# Patient Record
Sex: FEMALE | Race: WHITE | NOT HISPANIC OR LATINO | Employment: STUDENT | URBAN - METROPOLITAN AREA
[De-identification: names, ages, dates, MRNs, and addresses within clinical notes are randomized per-mention and may not be internally consistent; named-entity substitution may affect disease eponyms.]

---
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
## 2022-07-26 ENCOUNTER — OFFICE VISIT (OUTPATIENT)
Dept: OBGYN CLINIC | Facility: CLINIC | Age: 22
End: 2022-07-26
Payer: COMMERCIAL

## 2022-07-26 VITALS
SYSTOLIC BLOOD PRESSURE: 122 MMHG | DIASTOLIC BLOOD PRESSURE: 84 MMHG | HEIGHT: 59 IN | BODY MASS INDEX: 28.1 KG/M2 | WEIGHT: 139.4 LBS

## 2022-07-26 DIAGNOSIS — N92.0 MENORRHAGIA WITH REGULAR CYCLE: Primary | ICD-10-CM

## 2022-07-26 DIAGNOSIS — Z30.011 OCP (ORAL CONTRACEPTIVE PILLS) INITIATION: ICD-10-CM

## 2022-07-26 PROCEDURE — 99202 OFFICE O/P NEW SF 15 MIN: CPT | Performed by: NURSE PRACTITIONER

## 2022-07-26 RX ORDER — NORETHINDRONE ACETATE AND ETHINYL ESTRADIOL 1MG-20(21)
1 KIT ORAL DAILY
Qty: 84 TABLET | Refills: 0 | Status: SHIPPED | OUTPATIENT
Start: 2022-07-26 | End: 2022-10-18

## 2022-07-26 NOTE — PROGRESS NOTES
Jami Maharaj is a 25 y o  female who presents for contraception counseling for protection as well as regulation of her periods  She is interested in initiation oral contraception  Her cycles are 28 days apart  Patient reports to have heavy menses during cycles and states to "go through a super tampon in about an hour "  The patient is sexually active  Pertinent past medical history: none  Menstrual History:    OB History        0    Para   0    Term   0       0    AB   0    Living   0       SAB   0    IAB   0    Ectopic   0    Multiple   0    Live Births   0                  Patient's last menstrual period was 2022 (exact date)  The following portions of the patient's history were reviewed and updated as appropriate: allergies, current medications, past family history, past medical history, past social history, past surgical history and problem list     Review of Systems  Pertinent items are noted in HPI  Objective      /84   Ht 4' 11" (1 499 m)   Wt 63 2 kg (139 lb 6 4 oz)   LMP 2022 (Exact Date)   BMI 28 16 kg/m²       Assessment and Plan     25 y o , starting OCP (estrogen/progesterone), no contraindications  I have reviewed the risk and benefits of Oral OCP's, including the risk of blood clots, elevated BP, weight gain and Headaches  Benefits include reducing painful menses and heavy bleeding  Administration reviewed as well  She is to start her pill pack with the start of her next menses  Discussed importance of taking pill daily at the same time  Discussed what to do if she misses a dose  Recommended consistent use of condoms as well  Follow up in 2 months

## 2022-10-31 ENCOUNTER — OFFICE VISIT (OUTPATIENT)
Dept: OBGYN CLINIC | Facility: CLINIC | Age: 22
End: 2022-10-31

## 2022-10-31 VITALS
BODY MASS INDEX: 28.22 KG/M2 | SYSTOLIC BLOOD PRESSURE: 116 MMHG | DIASTOLIC BLOOD PRESSURE: 78 MMHG | HEIGHT: 59 IN | WEIGHT: 140 LBS

## 2022-10-31 DIAGNOSIS — N92.0 MENORRHAGIA WITH REGULAR CYCLE: ICD-10-CM

## 2022-10-31 DIAGNOSIS — Z30.011 OCP (ORAL CONTRACEPTIVE PILLS) INITIATION: ICD-10-CM

## 2022-10-31 RX ORDER — NORETHINDRONE ACETATE AND ETHINYL ESTRADIOL 1MG-20(21)
1 KIT ORAL DAILY
Qty: 84 TABLET | Refills: 3 | Status: SHIPPED | OUTPATIENT
Start: 2022-10-31 | End: 2023-01-23

## 2022-10-31 NOTE — PROGRESS NOTES
Kapil Pina is a 23 y o  female who presents for contraception follow-up  She was started on OCP to regulate her heavy menses  The patient has no complaints today  Menses is light and having no cramps  The patient is sexually active  Pertinent past medical history: none  Menstrual History:  OB History        0    Para   0    Term   0       0    AB   0    Living   0       SAB   0    IAB   0    Ectopic   0    Multiple   0    Live Births   0                  Patient's last menstrual period was 10/17/2022  The following portions of the patient's history were reviewed and updated as appropriate: allergies, current medications, past family history, past medical history, past social history, past surgical history and problem list     Review of Systems  Pertinent items are noted in HPI  Objective      /78   Ht 4' 11" (1 499 m)   Wt 63 5 kg (140 lb)   LMP 10/17/2022   BMI 28 28 kg/m²      Physical Exam  Constitutional:       Appearance: She is well-developed  HENT:      Head: Normocephalic and atraumatic  Cardiovascular:      Rate and Rhythm: Normal rate and regular rhythm  Pulmonary:      Effort: Pulmonary effort is normal       Breath sounds: Normal breath sounds  Musculoskeletal:      Cervical back: Neck supple  Neurological:      Mental Status: She is alert and oriented to person, place, and time  Skin:     General: Skin is warm  Vitals and nursing note reviewed  Assessment and Plan     23 y o , continuing OCP (estrogen/progesterone), no contraindications  A refill of her OCP was sent to her pharmacy for one year  Follow up in 1 year for annual exam with pap smear or sooner if needed

## 2023-09-07 DIAGNOSIS — Z30.011 OCP (ORAL CONTRACEPTIVE PILLS) INITIATION: ICD-10-CM

## 2023-09-07 DIAGNOSIS — N92.0 MENORRHAGIA WITH REGULAR CYCLE: ICD-10-CM

## 2023-09-07 RX ORDER — NORETHINDRONE ACETATE AND ETHINYL ESTRADIOL AND FERROUS FUMARATE 1MG-20(21)
1 KIT ORAL DAILY
Qty: 84 TABLET | Refills: 0 | Status: SHIPPED | OUTPATIENT
Start: 2023-09-07

## 2023-11-30 DIAGNOSIS — Z30.011 OCP (ORAL CONTRACEPTIVE PILLS) INITIATION: ICD-10-CM

## 2023-11-30 DIAGNOSIS — N92.0 MENORRHAGIA WITH REGULAR CYCLE: ICD-10-CM

## 2023-11-30 RX ORDER — NORETHINDRONE ACETATE AND ETHINYL ESTRADIOL AND FERROUS FUMARATE 1MG-20(21)
1 KIT ORAL DAILY
Qty: 84 TABLET | Refills: 0 | Status: SHIPPED | OUTPATIENT
Start: 2023-11-30

## 2024-01-19 ENCOUNTER — ANNUAL EXAM (OUTPATIENT)
Dept: OBGYN CLINIC | Facility: CLINIC | Age: 24
End: 2024-01-19
Payer: COMMERCIAL

## 2024-01-19 VITALS
BODY MASS INDEX: 29.92 KG/M2 | WEIGHT: 148.4 LBS | DIASTOLIC BLOOD PRESSURE: 82 MMHG | SYSTOLIC BLOOD PRESSURE: 120 MMHG | HEIGHT: 59 IN

## 2024-01-19 DIAGNOSIS — N92.0 MENORRHAGIA WITH REGULAR CYCLE: ICD-10-CM

## 2024-01-19 DIAGNOSIS — Z01.419 ENCOUNTER FOR GYNECOLOGICAL EXAMINATION WITHOUT ABNORMAL FINDING: Primary | ICD-10-CM

## 2024-01-19 PROCEDURE — S0612 ANNUAL GYNECOLOGICAL EXAMINA: HCPCS | Performed by: PHYSICIAN ASSISTANT

## 2024-01-19 PROCEDURE — G0145 SCR C/V CYTO,THINLAYER,RESCR: HCPCS | Performed by: PHYSICIAN ASSISTANT

## 2024-01-19 RX ORDER — NORETHINDRONE ACETATE AND ETHINYL ESTRADIOL 1MG-20(21)
1 KIT ORAL DAILY
Qty: 84 TABLET | Refills: 3 | Status: SHIPPED | OUTPATIENT
Start: 2024-01-19

## 2024-01-19 RX ORDER — IBUPROFEN 600 MG/1
600 TABLET ORAL EVERY 6 HOURS PRN
COMMUNITY
Start: 2023-02-04 | End: 2024-02-04

## 2024-01-19 NOTE — PROGRESS NOTES
Assessment/Plan:    No problem-specific Assessment & Plan notes found for this encounter.       Diagnoses and all orders for this visit:    Encounter for gynecological examination without abnormal finding  -     Liquid-based pap, screening    Menorrhagia with regular cycle  -     norethindrone-ethinyl estradiol (Junel FE 1/20) 1-20 MG-MCG per tablet; Take 1 tablet by mouth daily    Other orders  -     ibuprofen (MOTRIN) 600 mg tablet; Take 600 mg by mouth every 6 (six) hours as needed        Pap done.  Refills of OCP sent to pharmacy.  Discussed hygiene and lifestyle changes to help prevent yeast infections.  Could be hormonally related.  Patient to call if they continue.  If no problems, patient to return in 1 year for routine gyn care.    Subjective:      Patient ID: Katherin Diamond is a 23 y.o. female.    Patient is here for annual exam.  States she is doing well.  No complaints on her OCP.  Periods are regular every 28 days, and bleeding lasts for 4 days.  She denies any heavy bleeding, severe cramping, headache, and mood symptoms.  Requests refills today.  She is sexually active with a monogamous partner; they are not using condoms.  She complains of recurrent yeast infections since October.  First one started after abx use.  Had cultures performed at school and was negative for BV, trichomonas, GC/chlamydia, ureaplasma, and mycoplasma.  Positive for C albicans.  Has been getting them about a week prior to her periods.  No other medication or lifestyle changes.  Has been treated with Diflucan.  She is asymptomatic today.  Patient denies any change in bowel/bladder habits, pelvic pain, bloating, abdominal pain, n/v, change in appetite, and thyroid disease.    Patient is performing self-breast exam.  Denies new masses, skin changes, nipple discharge, and pain/tenderness.        The following portions of the patient's history were reviewed and updated as appropriate: allergies, current medications, past family  "history, past medical history, past social history, past surgical history, and problem list.    Review of Systems   Constitutional:  Negative for appetite change and unexpected weight change.   Cardiovascular:         No masses, skin changes, nipple discharge, and pain/tenderness.   Gastrointestinal:  Negative for abdominal distention, abdominal pain, constipation, diarrhea, nausea and vomiting.   Genitourinary:  Negative for difficulty urinating, dysuria, frequency, genital sores, hematuria, menstrual problem, pelvic pain, urgency, vaginal bleeding, vaginal discharge and vaginal pain.         Objective:      /82 (BP Location: Left arm, Patient Position: Sitting, Cuff Size: Adult)   Ht 4' 11\" (1.499 m)   Wt 67.3 kg (148 lb 6.4 oz)   LMP 01/15/2024 (Exact Date)   BMI 29.97 kg/m²          Physical Exam  Vitals reviewed. Exam conducted with a chaperone present.   Constitutional:       Appearance: Normal appearance. She is well-developed.   Neck:      Thyroid: No thyromegaly.   Pulmonary:      Effort: Pulmonary effort is normal.   Chest:   Breasts:     Breasts are symmetrical.      Right: Normal. No swelling, bleeding, inverted nipple, mass, nipple discharge, skin change or tenderness.      Left: Normal. No swelling, bleeding, inverted nipple, mass, nipple discharge, skin change or tenderness.   Abdominal:      General: There is no distension.      Palpations: Abdomen is soft.      Tenderness: There is no abdominal tenderness.   Genitourinary:     Pubic Area: No rash.       Labia:         Right: No rash, tenderness, lesion or injury.         Left: No rash, tenderness, lesion or injury.       Vagina: Normal. No vaginal discharge, erythema, tenderness or bleeding.      Cervix: Normal.      Uterus: Normal.       Adnexa: Right adnexa normal and left adnexa normal.        Right: No mass, tenderness or fullness.          Left: No mass, tenderness or fullness.     Musculoskeletal:      Cervical back: Neck supple. "   Lymphadenopathy:      Cervical: No cervical adenopathy.      Upper Body:      Right upper body: No supraclavicular or axillary adenopathy.      Left upper body: No supraclavicular or axillary adenopathy.      Lower Body: No right inguinal adenopathy. No left inguinal adenopathy.   Skin:     General: Skin is warm and dry.   Neurological:      Mental Status: She is alert and oriented to person, place, and time.   Psychiatric:         Behavior: Behavior normal. Behavior is cooperative.         Thought Content: Thought content normal.         Judgment: Judgment normal.

## 2024-01-19 NOTE — PATIENT INSTRUCTIONS
Refills of OCP sent to pharmacy.    Discussed hygiene and lifestyle changes to help with infections.  Call if yeast infections continue.

## 2024-01-25 LAB
LAB AP GYN PRIMARY INTERPRETATION: NORMAL
Lab: NORMAL

## 2024-05-23 ENCOUNTER — TELEPHONE (OUTPATIENT)
Dept: FAMILY MEDICINE CLINIC | Facility: CLINIC | Age: 24
End: 2024-05-23

## 2024-05-24 NOTE — TELEPHONE ENCOUNTER
05/23/24 11:46 PM        The office's request has been received, reviewed, and the patient chart updated. The PCP has successfully been removed with a patient attribution note. This message will now be completed.        Thank you  Luly Neville

## 2024-06-27 ENCOUNTER — APPOINTMENT (OUTPATIENT)
Dept: LAB | Facility: CLINIC | Age: 24
End: 2024-06-27

## 2024-06-27 ENCOUNTER — OCCMED (OUTPATIENT)
Dept: URGENT CARE | Facility: CLINIC | Age: 24
End: 2024-06-27

## 2024-06-27 DIAGNOSIS — Z02.1 PRE-EMPLOYMENT EXAMINATION: ICD-10-CM

## 2024-06-27 DIAGNOSIS — Z02.1 PRE-EMPLOYMENT EXAMINATION: Primary | ICD-10-CM

## 2024-06-27 LAB
MEV IGG SER QL IA: NORMAL
MUV IGG SER QL IA: NORMAL
RUBV IGG SERPL IA-ACNC: 39 IU/ML
VZV IGG SER QL IA: ABNORMAL

## 2024-06-27 PROCEDURE — 86787 VARICELLA-ZOSTER ANTIBODY: CPT

## 2024-06-27 PROCEDURE — 86480 TB TEST CELL IMMUN MEASURE: CPT

## 2024-06-27 PROCEDURE — 36415 COLL VENOUS BLD VENIPUNCTURE: CPT

## 2024-06-27 PROCEDURE — 86735 MUMPS ANTIBODY: CPT

## 2024-06-27 PROCEDURE — 86765 RUBEOLA ANTIBODY: CPT

## 2024-06-27 PROCEDURE — 86762 RUBELLA ANTIBODY: CPT

## 2024-06-28 LAB
GAMMA INTERFERON BACKGROUND BLD IA-ACNC: 0.01 IU/ML
M TB IFN-G BLD-IMP: NEGATIVE
M TB IFN-G CD4+ BCKGRND COR BLD-ACNC: 0 IU/ML
M TB IFN-G CD4+ BCKGRND COR BLD-ACNC: 0.01 IU/ML
MITOGEN IGNF BCKGRD COR BLD-ACNC: 9.99 IU/ML

## 2024-09-10 ENCOUNTER — OFFICE VISIT (OUTPATIENT)
Dept: DERMATOLOGY | Facility: CLINIC | Age: 24
End: 2024-09-10
Payer: COMMERCIAL

## 2024-09-10 ENCOUNTER — TELEPHONE (OUTPATIENT)
Age: 24
End: 2024-09-10

## 2024-09-10 VITALS — TEMPERATURE: 96.8 F | WEIGHT: 146 LBS | BODY MASS INDEX: 29.43 KG/M2 | HEIGHT: 59 IN

## 2024-09-10 DIAGNOSIS — L20.89 OTHER ATOPIC DERMATITIS: Primary | ICD-10-CM

## 2024-09-10 PROCEDURE — 99204 OFFICE O/P NEW MOD 45 MIN: CPT | Performed by: DERMATOLOGY

## 2024-09-10 RX ORDER — PIMECROLIMUS 10 MG/G
CREAM TOPICAL
Qty: 60 G | Refills: 0 | Status: SHIPPED | OUTPATIENT
Start: 2024-09-10 | End: 2024-09-18

## 2024-09-10 NOTE — PROGRESS NOTES
"[facility]at[facility] Note     Patient Name: Katherin Diamond  Encounter Date: 9/10/24     Have you been cared for by a [facility] Dermatologist in the last 3 years and, if so, which description applies to you?    NO.   I am considered a \"new\" patient and must complete all patient intake questions. I am FEMALE/of child-bearing potential.    REVIEW OF SYSTEMS:  Have you recently had or currently have any of the following? Recent fever or chills? No  Any non-healing wound? No  Are you pregnant or planning to become pregnant? No  Are you currently or planning to be nursing or breast feeding? No   PAST MEDICAL HISTORY:  Have you personally ever had or currently have any of the following?  If \"YES,\" then please provide more detail. Skin cancer (such as Melanoma, Basal Cell Carcinoma, Squamous Cell Carcinoma?  No  Tuberculosis, HIV/AIDS, Hepatitis B or C: No  Radiation Treatment No   HISTORY OF IMMUNOSUPPRESSION:   Do you have a history of any of the following:  Systemic Immunosuppression such as Diabetes, Biologic or Immunotherapy, Chemotherapy, Organ Transplantation, Bone Marrow Transplantation or Prednsione?  No    Answering \"YES\" requires the addition of the dotphrase \"IMMUNOSUPPRESSED\" as the first diagnosis of the patient's visit.   FAMILY HISTORY:  Any \"first degree relatives\" (parent, brother, sister, or child) with the following?    Skin Cancer, Pancreatic or Other Cancer? No   PATIENT EXPERIENCE:    Do you want the Dermatologist to perform a COMPLETE skin exam today including a clinical examination under the \"bra and underwear\" areas?  NO  If necessary, do we have your permission to call and leave a detailed message on your Preferred Phone number that includes your specific medical information?  Yes      Allergies   Allergen Reactions    Doxycycline Hives      Current Outpatient Medications:     ibuprofen (MOTRIN) 600 mg tablet, Take 600 mg by mouth every 6 (six) hours as needed, Disp: , Rfl:     " norethindrone-ethinyl estradiol (Junel FE 1/20) 1-20 MG-MCG per tablet, Take 1 tablet by mouth daily, Disp: 84 tablet, Rfl: 3          Whom besides the patient is providing clinical information about today's encounter?   NO ADDITIONAL HISTORIAN (patient alone provided history)    Physical Exam and Assessment/Plan by Diagnosis:    DERMATITIS    Physical Exam:  Anatomic Location Affected:  right eyelid  Morphological Description:  erythematous plaque with desquamation     Additional History of Present Condition:  Patient reports several months of dry skin around her right eye. She says it comes and goes. She noted it starting in April. She says that it is not itchy and sometimes becomes painful and cracked. She has noticed eyelid swelling when the irritation is more severe. She has not had any involvement of the other eye. She does not apply any makeup or other cosmetic products. She uses CeraVe and Aquaphor to moisturize, but these have not provided complete relief.     Assessment and Plan:  Based on a thorough discussion of this condition and the management approach to it (including a comprehensive discussion of the known risks, side effects and potential benefits of treatment), the patient (family) agrees to implement the following specific plan:    Reviewed gentle skin care in detail (no hot showers, limited soap usage to armpits, private area and feet, no washcloth, gentle pat dry with towel following shower, moisturizing with creams, ointments- not lotions). I recommend the La Roche Posay Lipikar Balm AP+ Moisturizing Cream   Elidel cream- morning and evening thin layer until rash resolved and then for additional 2 to 3 days. Can then use as needed.    Nika Nguyen MD  Dermatology, PGY-2  Scribe Attestation      I,:  Linda Pelaez MA am acting as a scribe while in the presence of the attending physician.:       I,:  Idania Camp MD personally performed the services described in this documentation    as  scribed in my presence.:

## 2024-09-10 NOTE — PATIENT INSTRUCTIONS
Assessment and Plan:  Based on a thorough discussion of this condition and the management approach to it (including a comprehensive discussion of the known risks, side effects and potential benefits of treatment), the patient (family) agrees to implement the following specific plan:     Reviewed gentle skin care in detail (no hot showers, limited soap usage to armpits, private area and feet, no washcloth, gentle pat dry with towel following shower, moisturizing with creams, ointments- not lotions). I recommend the La Roche Posay Lipikar Balm AP+ Moisturizing Cream   Eledel, morning and evening thin layer for 2 to 3 days then use it as needed   Conjunctivae and eyelids appear normal,  Sclerae : White without injection

## 2024-09-11 NOTE — TELEPHONE ENCOUNTER
PA for Pimecrolimus SUBMITTED     via    []CMM-KEY:    [x]Trung-Case ID #    []Faxed to plan   []Other website    []Phone call Case ID #        Office notes sent, clinical questions answered. Awaiting determination    Turnaround time for your insurance to make a decision on your Prior Authorization can take 7-21 business days.

## 2024-09-13 NOTE — TELEPHONE ENCOUNTER
PA for Pimecrolimus 1% cream DENIED    Reason:(Screenshot if applicable)        Message sent to office clinical pool Yes    Denial letter scanned into Media Yes    Appeal started No (Provider will need to decide if appeal is warranted and send clinical documentation to Prior Authorization Team for initiation.)    **Please follow up with your patient regarding denial and next steps**

## 2024-09-18 DIAGNOSIS — H01.119 EYELID DERMATITIS, ALLERGIC/CONTACT: Primary | ICD-10-CM

## 2024-09-18 RX ORDER — TACROLIMUS 1 MG/G
OINTMENT TOPICAL
Qty: 60 G | Refills: 3 | Status: SHIPPED | OUTPATIENT
Start: 2024-09-18

## 2024-09-18 NOTE — TELEPHONE ENCOUNTER
Spoke with patient. Patient aware. Advised patient to call back if she had any trouble picking up prescription.

## 2024-09-18 NOTE — TELEPHONE ENCOUNTER
Let patient know the insurance wants us to use tacrolimus instead. Similar medicine but it is an ointment instead of a cream. I have sent the script to the pharmacy and it should be covered

## 2024-09-20 ENCOUNTER — TELEPHONE (OUTPATIENT)
Age: 24
End: 2024-09-20

## 2024-09-20 NOTE — TELEPHONE ENCOUNTER
PA for Tacrolimus 0.1% ointment SUBMITTED     via    []CMM-KEY:   [x]Trung-Case ID #  [id number]    []Faxed to plan   []Other website   []Phone call Case ID #     Office notes sent, clinical questions answered. Awaiting determination    Turnaround time for your insurance to make a decision on your Prior Authorization can take 7-21 business days.

## 2024-09-25 NOTE — TELEPHONE ENCOUNTER
PA for Tacrolimus 0.1% ointment APPROVED     Date(s) approved 09/20/2024 - 09/20/2025    Case # [id number]       Patient advised by          []Visionarityt Message  [x]Phone call   []LMOM  []L/M to call office as no active Communication consent on file  []Unable to leave detailed message as VM not approved on Communication consent       Pharmacy advised by    [x]Fax  []Phone call    Approval letter scanned into Media Yes

## 2025-01-28 ENCOUNTER — OFFICE VISIT (OUTPATIENT)
Dept: OBGYN CLINIC | Facility: CLINIC | Age: 25
End: 2025-01-28
Payer: COMMERCIAL

## 2025-01-28 VITALS
DIASTOLIC BLOOD PRESSURE: 82 MMHG | HEIGHT: 59 IN | WEIGHT: 146 LBS | BODY MASS INDEX: 29.43 KG/M2 | SYSTOLIC BLOOD PRESSURE: 118 MMHG

## 2025-01-28 DIAGNOSIS — Z01.419 ENCOUNTER FOR ANNUAL ROUTINE GYNECOLOGICAL EXAMINATION: Primary | ICD-10-CM

## 2025-01-28 DIAGNOSIS — N92.0 MENORRHAGIA WITH REGULAR CYCLE: ICD-10-CM

## 2025-01-28 PROCEDURE — S0612 ANNUAL GYNECOLOGICAL EXAMINA: HCPCS | Performed by: OBSTETRICS & GYNECOLOGY

## 2025-01-28 RX ORDER — FLUCONAZOLE 150 MG/1
TABLET ORAL
COMMUNITY
Start: 2024-10-29

## 2025-01-28 RX ORDER — NITROFURANTOIN 25; 75 MG/1; MG/1
CAPSULE ORAL
COMMUNITY
Start: 2024-10-29

## 2025-01-28 RX ORDER — NORETHINDRONE ACETATE AND ETHINYL ESTRADIOL 1MG-20(21)
1 KIT ORAL DAILY
Qty: 84 TABLET | Refills: 4 | Status: SHIPPED | OUTPATIENT
Start: 2025-01-28

## 2025-01-28 NOTE — PROGRESS NOTES
Subjective      Katherin Diamond is a 24 y.o. female who presents for annual GYN exam. Patient uses she/her pronouns.    GYN:  Menses are regular, q 28 days, lasting 3 days. She denies intermenstrual bleeding, or spotting. Periods are super light and denies dysmenorrhea.  She denies vaginal discharge, labial erythema or lesions, dyspareunia.  Contraception: OCPs (Junel Fe).  Patient is sexually active with her boyfriend - together a little over 2 years. She is safe in her relationship. She does not use condoms - monogamous    OB:  OB History    Para Term  AB Living   0 0 0 0 0 0   SAB IAB Ectopic Multiple Live Births   0 0 0 0 0     :  She denies dysuria, urinary frequency or urgency.  She denies hematuria, flank pain, incontinence.    Breast:  She denies breast mass, skin changes, dimpling, reddening, nipple retraction.  She denies breast discharge.  Patient does not have a family history of breast, endometrial, colon, or ovarian ca.     Cancer-related family history is not on file.    History reviewed. No pertinent past medical history.    Past Surgical History:   Procedure Laterality Date    MOUTH SURGERY       General:  Diet: Good  Exercise: couples times per week at the gym; running around at work; when the weather is good 2 mile walk around the neighborhood  Work: Nurses aid in OR at [employer]  Safety: Back home with mom and dad now  Education: Recently graduated from [employer]; Going to [employer] in the fall for nursing!     Social History     Tobacco Use    Smoking status: Never    Smokeless tobacco: Never   Vaping Use    Vaping status: Never Used   Substance Use Topics    Alcohol use: Yes     Comment: socially    Drug use: No       Screening:  Cervical cancer: last pap smear in 2024. Results were NILM. Patient has received Gardasil vaccine.   Breast cancer: Not yet indicated   Colon cancer: Not yet indicated   STD screening: none to review.    Review of Systems   Constitutional:   "Negative for appetite change, chills and fever.   HENT:  Negative for trouble swallowing.    Respiratory:  Negative for shortness of breath.    Cardiovascular:  Negative for chest pain.   Gastrointestinal:  Negative for abdominal pain, constipation, diarrhea, nausea and vomiting.   Genitourinary:  Negative for decreased urine volume, difficulty urinating, dyspareunia, dysuria, flank pain, frequency, genital sores, hematuria, menstrual problem, pelvic pain, urgency, vaginal bleeding, vaginal discharge and vaginal pain.          Objective      /82 (BP Location: Left arm, Patient Position: Sitting, Cuff Size: Standard)   Ht 4' 11\" (1.499 m)   Wt 66.2 kg (146 lb)   LMP 01/14/2025 (Exact Date)   BMI 29.49 kg/m²   Physical Exam  Vitals reviewed.   Constitutional:       General: She is not in acute distress.     Appearance: Normal appearance. She is well-developed. She is not ill-appearing, toxic-appearing or diaphoretic.   Neck:      Thyroid: No thyroid mass, thyromegaly or thyroid tenderness.   Cardiovascular:      Rate and Rhythm: Normal rate and regular rhythm.      Heart sounds: Normal heart sounds. No murmur heard.     No friction rub. No gallop.   Pulmonary:      Effort: Pulmonary effort is normal. No respiratory distress.      Breath sounds: Normal breath sounds. No stridor. No wheezing, rhonchi or rales.   Chest:      Chest wall: No tenderness.   Breasts:     Breasts are symmetrical.      Right: No swelling, bleeding, inverted nipple, mass, nipple discharge, skin change or tenderness.      Left: No swelling, bleeding, inverted nipple, mass, nipple discharge, skin change or tenderness.   Abdominal:      General: There is no distension.      Palpations: Abdomen is soft.      Tenderness: There is no abdominal tenderness.   Genitourinary:     General: Normal vulva.      Exam position: Lithotomy position.      Labia:         Right: No rash, tenderness, lesion or injury.         Left: No rash, tenderness, " lesion or injury.       Urethra: No prolapse or urethral lesion.      Vagina: No signs of injury and foreign body. No vaginal discharge (physiologic white), erythema, tenderness, bleeding, lesions or prolapsed vaginal walls.      Cervix: No cervical motion tenderness, discharge, friability, lesion, erythema, cervical bleeding or eversion.      Uterus: Not deviated, not enlarged, not fixed, not tender and no uterine prolapse.       Adnexa:         Right: No mass, tenderness or fullness.          Left: No mass, tenderness or fullness.        Rectum: No external hemorrhoid.   Lymphadenopathy:      Cervical: No cervical adenopathy.      Upper Body:      Right upper body: No supraclavicular, axillary or pectoral adenopathy.      Left upper body: No supraclavicular, axillary or pectoral adenopathy.   Skin:     General: Skin is warm and dry.   Neurological:      Mental Status: She is alert and oriented to person, place, and time.   Psychiatric:         Behavior: Behavior normal. Behavior is cooperative.                Assessment & Plan  Encounter for annual routine gynecological examination  No GYN concerns today  Birth control: Continue OCPs; Refills provided  Cervical cancer screening: Due 1/2027  Breast Cancer screening: Due age 42  Colon cancer Screening: Due age 47  STD screening: None  Reviewed healthy lifestyle and safe sex practices  RTO for annual exam or PRN           Claritza Brice MD  OB/GYN  1/28/2025  8:26 AM

## 2025-05-21 ENCOUNTER — APPOINTMENT (OUTPATIENT)
Dept: LAB | Age: 25
End: 2025-05-21
Payer: COMMERCIAL

## 2025-05-21 ENCOUNTER — OFFICE VISIT (OUTPATIENT)
Dept: URGENT CARE | Age: 25
End: 2025-05-21

## 2025-05-21 VITALS
BODY MASS INDEX: 27.53 KG/M2 | TEMPERATURE: 97.8 F | WEIGHT: 145.8 LBS | HEIGHT: 61 IN | SYSTOLIC BLOOD PRESSURE: 110 MMHG | HEART RATE: 88 BPM | OXYGEN SATURATION: 99 % | RESPIRATION RATE: 18 BRPM | DIASTOLIC BLOOD PRESSURE: 80 MMHG

## 2025-05-21 DIAGNOSIS — Z02.0 SCHOOL PHYSICAL EXAM: Primary | ICD-10-CM

## 2025-05-21 DIAGNOSIS — Z00.00 ROUTINE GENERAL MEDICAL EXAMINATION AT A HEALTH CARE FACILITY: ICD-10-CM

## 2025-05-21 NOTE — PROGRESS NOTES
[facility]        NAME: Katherin Diamond is a 24 y.o. female  : 2000    MRN: [id number]  DATE: May 21, 2025  TIME: 10:37 AM    Assessment and Plan   School physical exam [Z02.0]  1. School physical exam            Patient Instructions   Form for school completed and copy provided.    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        Review of Systems   Review of Systems   Constitutional:  Negative for chills and fever.   HENT:  Negative for ear pain and sore throat.    Eyes:  Negative for pain and visual disturbance.   Respiratory:  Negative for cough and shortness of breath.    Cardiovascular:  Negative for chest pain and palpitations.   Gastrointestinal:  Negative for abdominal pain and vomiting.   Genitourinary:  Negative for dysuria and hematuria.   Musculoskeletal:  Negative for arthralgias and back pain.   Skin:  Negative for color change and rash.   Neurological:  Negative for dizziness, seizures, syncope, weakness and numbness.   All other systems reviewed and are negative.        Current Medications     Current Medications[1]    Current Allergies     Allergies as of 2025 - Reviewed 2025   Allergen Reaction Noted   • Doxycycline Hives 2022            The following portions of the patient's history were reviewed and updated as appropriate: allergies, current medications, past family history, past medical history, past social history, past surgical history and problem list.     History reviewed. No pertinent past medical history.    Past Surgical History:   Procedure Laterality Date   • MOUTH SURGERY         Family History   Problem Relation Age of Onset   • Parkinsonism Maternal Grandmother          Medications have been verified.        Objective   /80 (BP Location: Right arm, Patient Position: Sitting,  "Cuff Size: Standard)   Pulse 88   Temp 97.8 °F (36.6 °C)   Resp 18   Ht 5' 0.75\" (1.543 m)   Wt 66.1 kg (145 lb 12.8 oz)   SpO2 99%   BMI 27.78 kg/m²   No LMP recorded. (Menstrual status: Birth Control).      Physical Exam     Physical Exam  Constitutional:       General: She is not in acute distress.     Appearance: Normal appearance. She is not ill-appearing or toxic-appearing.   HENT:      Head: Normocephalic.      Right Ear: Tympanic membrane normal.      Left Ear: Tympanic membrane normal.      Nose: Nose normal.      Mouth/Throat:      Mouth: Mucous membranes are moist.      Pharynx: Oropharynx is clear.     Eyes:      Extraocular Movements: Extraocular movements intact.      Conjunctiva/sclera: Conjunctivae normal.      Pupils: Pupils are equal, round, and reactive to light.       Cardiovascular:      Rate and Rhythm: Normal rate.      Pulses: Normal pulses.      Heart sounds: Normal heart sounds.   Pulmonary:      Effort: Pulmonary effort is normal. No respiratory distress.      Breath sounds: Normal breath sounds. No stridor. No wheezing, rhonchi or rales.   Chest:      Chest wall: No tenderness.   Abdominal:      General: Bowel sounds are normal.      Palpations: Abdomen is soft.     Musculoskeletal:         General: Normal range of motion.      Cervical back: Normal range of motion and neck supple.     Skin:     General: Skin is warm and dry.      Capillary Refill: Capillary refill takes less than 2 seconds.     Neurological:      General: No focal deficit present.      Mental Status: She is alert and oriented to person, place, and time. Mental status is at baseline.                          [1]    Current Outpatient Medications:   •  fluconazole (DIFLUCAN) 150 mg tablet, TAKE 1 TABLET BY MOUTH ON DAY 1, AND 1 TABLET ON DAY 4 (Patient not taking: Reported on 1/28/2025), Disp: , Rfl:   •  ibuprofen (MOTRIN) 600 mg tablet, Take 600 mg by mouth every 6 (six) hours as needed (Patient not taking: " Reported on 1/28/2025), Disp: , Rfl:   •  nitrofurantoin (MACROBID) 100 mg capsule, , Disp: , Rfl:   •  norethindrone-ethinyl estradiol (Junel FE 1/20) 1-20 MG-MCG per tablet, Take 1 tablet by mouth daily, Disp: 84 tablet, Rfl: 4  •  tacrolimus (PROTOPIC) 0.1 % ointment, Use 2 times a day to affected area, Disp: 60 g, Rfl: 3

## 2025-05-22 ENCOUNTER — APPOINTMENT (OUTPATIENT)
Dept: LAB | Facility: IMAGING CENTER | Age: 25
End: 2025-05-22

## 2025-05-22 PROCEDURE — 36415 COLL VENOUS BLD VENIPUNCTURE: CPT

## 2025-05-23 LAB
GAMMA INTERFERON BACKGROUND BLD IA-ACNC: 0.02 IU/ML
HBV SURFACE AB SER-ACNC: 4.96 MIU/ML
M TB IFN-G BLD-IMP: NEGATIVE
M TB IFN-G CD4+ BCKGRND COR BLD-ACNC: -0.01 IU/ML
M TB IFN-G CD4+ BCKGRND COR BLD-ACNC: -0.01 IU/ML
MITOGEN IGNF BCKGRD COR BLD-ACNC: 9.98 IU/ML